# Patient Record
Sex: FEMALE | Race: WHITE
[De-identification: names, ages, dates, MRNs, and addresses within clinical notes are randomized per-mention and may not be internally consistent; named-entity substitution may affect disease eponyms.]

---

## 2017-11-29 NOTE — MAMMOGRAPHY REPORT
DIGITAL SCREENING MAMMOGRAM:  11/28/2017 

 

CLINICAL INDICATION:  A 67-year-old for screening. 

 

COMPARISON:  11/2016, 10/2015, 9/2014, 8/2013, 07/2012, 04/2011, 03/2010. 

 

TECHNIQUE:  Routine CC and MLO projections were obtained of the breasts. 

 

FINDINGS:  The breasts demonstrate scattered fibroglandular densities 
bilaterally.  Intramammary lymph nodes are stable.  Coarse and punctate, 
typically benign calcifications are present.  No suspicious masses, clustered 
microcalcifications, or regions of architectural distortion are identified. 

 

IMPRESSION:  BENIGN FINDINGS. 

 

RECOMMENDATION:  ROUTINE ANNUAL SCREENING UNLESS OTHERWISE CLINICALLY 
INDICATED. 

 

BIRADS CATEGORY:  2, BENIGN FINDINGS. 

 

STANDARD QUALIFYING STATEMENTS

1.  This examination was reviewed with the aid of Computed-Aided Detection (CAD)
.

2.  A negative or benign imaging report should not delay biopsy if clinically 
suspicious findings are present.  Consider surgical consultation if warranted.  
More than 5% of cancers are not identified by imaging.

3.  Dense breasts may obscure an underlying neoplasm. 

 

 

 

DD:11/29/2017 13:24:15  DT: 11/29/2017 17:15  JOB #: [id number]  EXT JOB #:
[id number]

[facility]

## 2019-01-17 NOTE — MAMMOGRAPHY REPORT
Reason:  SCREENING MAMMO

Procedure Date:  01/16/2019   

Accession Number:  [id number] / [id number]                    

Procedure:  KARENA - Screening Mammo w/Dave CPT Code:  

 

FULL RESULT:

 

 

EXAM: Screening Mammo w/Dave

 

DATE: 1/16/2019 11:49 AM

 

CLINICAL HISTORY: Routine screening

 

TECHNIQUE: Bilateral CC and MLO views were obtained.

 

COMPARISON: 11/28/2017, 11/3/2016, 10/19/2015, 9/2/14 and 8/7/2013

 

FINDINGS:

There are scattered fibroglandular densities. There has been no 

significant change. No suspicious masses, clustered microcalcifications, 

or regions of architectural distortion are identified. Bilateral nodular 

densities are stable.

 

IMPRESSION: Benign findings

 

RECOMMENDATION: Routine annual screening unless otherwise clinically 

indicated.

 

BIRADS CATEGORY 2: Benign findings

 

STANDARD QUALIFYING STATEMENTS:

1.  This examination was not reviewed with the aid of Computer-Aided 

Detection (CAD).

2.  A negative or benign  imaging report should not delay biopsy if 

clinically suspicious findings are present.  Consider surgical 

consultation if warrented.  More than 5% of cancers are not identified by 

imaging.

3.  Dense breasts may obscure an underlying neoplasm.

4. This examination was reviewed with the aid of 3D breast imaging 

(tomosynthesis).

## 2020-06-12 NOTE — MAMMOGRAPHY REPORT
BILATERAL DIGITAL SCREENING MAMMOGRAM 3D/2D: 6/11/2020

 

CLINICAL: Routine screening.  

 

Comparison is made to exams dated:  1/16/2019 mammogram, 11/28/2017 mammogram, and 11/3/2016 mammogra
m - [facility].  There are scattered fibroglandular elements in both breasts.  

 

No significant masses, calcifications, or other findings are seen in either breast.  

There has been no significant interval change.

 

IMPRESSION: NEGATIVE

There is no mammographic evidence of malignancy. A 1 year screening mammogram is recommended.  

 

 

This exam was interpreted at Station ID: [id number].  

 

NOTE: For mammograms, a report in lay terms will be sent to the patient. Approximately 15% of breast 
malignancies will not be visualized mammographically. In the management of a palpable breast mass, a 
negative mammogram must not discourage biopsy of a clinically suspicious lesion.

 

Electronically Signed By: Brian Roberson M.D.          

ddp/penrad:6/11/2020 16:42:40  

 

 

 

ACR BI-RADS Category 1: Negative 3341F

PARENCHYMAL PATTERN: (A) - The breast(s) demonstrate(s) scattered fibroglandular densities.

BI-RADS CATEGORY: (1) - 1

RECOMMENDATION: (ANNUAL)  - Recommend routine annual screening mammography.

[id number]

1 year screening

LATERALITY: (B)

## 2021-01-29 NOTE — CT REPORT
PROCEDURE:  CERVICAL SPINE WO

 

INDICATIONS:  fall off ladder

 

TECHNIQUE:  

Noncontrast 3 mm thick sections acquired from the skull base to the T4 level.  Sagittal and coronal r
eformats were then constructed.  For radiation dose reduction, the following was used:  automated exp
osure control, adjustment of mA and/or kV according to patient size.

 

COMPARISON:  None.

 

FINDINGS:  

Image quality:  Excellent.  

 

Bones:  No fractures or subluxation. There is multilevel degenerative disc disease including moderate
 degeneration at C4-C5, C5-C6, and C6-C7 with endplate sclerosis and osteophytosis. Mild uncovertebra
l joint arthropathy is also present in the mid and lower cervical spine. There is mild to moderate fa
cet arthropathy throughout the cervical spine. Visualized superior ribs are intact.  

 

Soft tissues:  Prevertebral soft tissues are normal in thickness.  No paravertebral hematomas.  No ap
ical pneumothoraces.  

 

IMPRESSION:  

 

1. No fracture or subluxation.

 

2. Multilevel degenerative changes throughout the cervical spine.

 

Reviewed by: Brian Roberson MD on 1/29/2021 3:37 PM PST

Approved by: Brian Roberson MD on 1/29/2021 3:37 PM PST

 

 

Station ID:  [id number]

## 2021-01-29 NOTE — CT REPORT
PROCEDURE:  HEAD WO

 

INDICATIONS:  fall off ladder

 

TECHNIQUE:  

Noncontrast 4.5 mm thick angled axial sections acquired from the foramen magnum to the vertex.  For r
adiation dose reduction, the following was used:  automated exposure control, adjustment of mA and/or
 kV according to patient size.

 

COMPARISON:  None.

 

FINDINGS:  

Image quality:  Excellent.  

 

CSF spaces:  Basal cisterns are patent.  No extra-axial fluid collections.  Ventricles are normal in 
size and shape.  

 

Brain:  No intracranial hemorrhage, mass, or mass effect. Gray-white matter interface is preserved.  


 

Skull and face:  Calvarium and visualized facial bones are intact, without suspicious lesions.  

 

Sinuses:  Visualized sinuses and mastoids are clear.  

 

IMPRESSION:  

 

1. No acute intracranial abnormality.

 

Reviewed by: Brian Roberson MD on 1/29/2021 3:34 PM PST

Approved by: Brian Roberson MD on 1/29/2021 3:34 PM [facility]

 

 

Station ID:  [id number]

## 2021-01-29 NOTE — XRAY REPORT
PROCEDURE:  Chest 1 View X-Ray

 

INDICATIONS:  PAIN S/P FALL

 

TECHNIQUE:  One view of the chest was acquired.  

 

COMPARISON:  None

 

FINDINGS:  

 

Surgical changes and devices:  None.  

 

Lungs and pleura:  No pleural effusions or pneumothorax.  Lungs are clear.  

 

Mediastinum:  Mediastinal contours appear normal.  Heart size is normal.  

 

Bones and chest wall:  No suspicious bony lesions.  Overlying soft tissues appear unremarkable.  

 

IMPRESSION:  

No acute cardiopulmonary pathology.

 

Reviewed by: Markie Newton MD on 1/29/2021 3:11 PM PST

Approved by: Markie Newton MD on 1/29/2021 3:11 PM [facility]

 

 

Station ID:  [id number]

## 2021-02-18 ENCOUNTER — HOSPITAL ENCOUNTER (OUTPATIENT)
Dept: HOSPITAL 76 - SDS | Age: 71
Discharge: HOME | End: 2021-02-18
Attending: SURGERY
Payer: MEDICARE

## 2021-02-18 VITALS — SYSTOLIC BLOOD PRESSURE: 138 MMHG | DIASTOLIC BLOOD PRESSURE: 70 MMHG

## 2021-02-18 DIAGNOSIS — K29.70: ICD-10-CM

## 2021-02-18 DIAGNOSIS — E78.5: ICD-10-CM

## 2021-02-18 DIAGNOSIS — I10: ICD-10-CM

## 2021-02-18 DIAGNOSIS — K57.30: ICD-10-CM

## 2021-02-18 DIAGNOSIS — K64.8: ICD-10-CM

## 2021-02-18 DIAGNOSIS — Z12.11: Primary | ICD-10-CM

## 2021-02-22 ENCOUNTER — HOSPITAL ENCOUNTER (OUTPATIENT)
Dept: HOSPITAL 76 - DI.N | Age: 71
Discharge: HOME | End: 2021-02-22
Attending: FAMILY MEDICINE
Payer: MEDICARE

## 2021-02-22 DIAGNOSIS — M54.5: Primary | ICD-10-CM

## 2021-02-22 DIAGNOSIS — Z91.81: ICD-10-CM

## 2021-02-22 DIAGNOSIS — M47.816: ICD-10-CM

## 2021-02-22 NOTE — XRAY REPORT
PROCEDURE:  Lumbar Spine 2 View

 

INDICATIONS:  LOW BACK PX, ACUTE S/P FALL

 

TECHNIQUE:  2 views of the lumbar spine were acquired.  

 

COMPARISON:  None.

 

FINDINGS:  

 

No fracture. Scattered multilevel endplate spurring and diffuse facet arthropathy.

Grade 1 anterolisthesis of L4 on L5. Trace retrolisthesis of L3 on L4. Diffuse mild narrowing of the 
lumbar disc spaces

 

 Soft tissues:  Overlying bowel gas pattern is normal.  No suspicious soft tissue calcifications.  

 

IMPRESSION:  

 

Lumbar spondylosis and grade 1 anterolisthesis of L4 on L5.

 

Facet arthropathy

 

 

Reviewed by: Gerardo Cagle MD on 2/22/2021 3:30 PM PST

Approved by: Gerardo Cagle MD on 2/22/2021 3:30 PM PST

 

 

Station ID:  [id number]

## 2021-06-16 ENCOUNTER — HOSPITAL ENCOUNTER (OUTPATIENT)
Dept: HOSPITAL 76 - LAB.WCP | Age: 71
Discharge: HOME | End: 2021-06-16
Attending: FAMILY MEDICINE
Payer: MEDICARE

## 2021-06-16 DIAGNOSIS — R73.01: Primary | ICD-10-CM

## 2021-06-16 DIAGNOSIS — I10: ICD-10-CM

## 2021-06-16 DIAGNOSIS — E78.5: ICD-10-CM

## 2021-06-16 LAB
ALBUMIN DIAFP-MCNC: 4.3 G/DL (ref 3.2–5.5)
ALBUMIN/GLOB SERPL: 1.5 {RATIO} (ref 1–2.2)
ALP SERPL-CCNC: 56 IU/L (ref 42–121)
ALT SERPL W P-5'-P-CCNC: 34 IU/L (ref 10–60)
ANION GAP SERPL CALCULATED.4IONS-SCNC: 10 MMOL/L (ref 6–13)
AST SERPL W P-5'-P-CCNC: 27 IU/L (ref 10–42)
BASOPHILS NFR BLD AUTO: 0.1 10^3/UL (ref 0–0.1)
BASOPHILS NFR BLD AUTO: 1.2 %
BILIRUB BLD-MCNC: 1.3 MG/DL (ref 0.2–1)
BUN SERPL-MCNC: 33 MG/DL (ref 6–20)
CALCIUM UR-MCNC: 10.6 MG/DL (ref 8.5–10.3)
CHLORIDE SERPL-SCNC: 97 MMOL/L (ref 101–111)
CHOLEST SERPL-MCNC: 232 MG/DL
CO2 SERPL-SCNC: 32 MMOL/L (ref 21–32)
CREAT SERPLBLD-SCNC: 0.9 MG/DL (ref 0.4–1)
EOSINOPHIL # BLD AUTO: 0.3 10^3/UL (ref 0–0.7)
EOSINOPHIL NFR BLD AUTO: 6 %
ERYTHROCYTE [DISTWIDTH] IN BLOOD BY AUTOMATED COUNT: 12.9 % (ref 12–15)
EST. AVERAGE GLUCOSE BLD GHB EST-MCNC: 100 MG/DL (ref 70–100)
GFRSERPLBLD MDRD-ARVRAT: 62 ML/MIN/{1.73_M2} (ref 89–?)
GLOBULIN SER-MCNC: 2.9 G/DL (ref 2.1–4.2)
GLUCOSE SERPL-MCNC: 111 MG/DL (ref 70–100)
HBA1C MFR BLD HPLC: 5.1 % (ref 4.27–6.07)
HCT VFR BLD AUTO: 45.3 % (ref 37–47)
HDLC SERPL-MCNC: 120 MG/DL
HDLC SERPL: 1.9 {RATIO} (ref ?–4.4)
HGB UR QL STRIP: 14.5 G/DL (ref 12–16)
LDLC SERPL CALC-MCNC: 104 MG/DL
LDLC/HDLC SERPL: 0.9 {RATIO} (ref ?–4.4)
LYMPHOCYTES # SPEC AUTO: 1.1 10^3/UL (ref 1.5–3.5)
LYMPHOCYTES NFR BLD AUTO: 25.3 %
MCH RBC QN AUTO: 29.3 PG (ref 27–31)
MCHC RBC AUTO-ENTMCNC: 32 G/DL (ref 32–36)
MCV RBC AUTO: 91.5 FL (ref 81–99)
MONOCYTES # BLD AUTO: 0.4 10^3/UL (ref 0–1)
MONOCYTES NFR BLD AUTO: 9.2 %
NEUTROPHILS # BLD AUTO: 2.4 10^3/UL (ref 1.5–6.6)
NEUTROPHILS # SNV AUTO: 4.2 X10^3/UL (ref 4.8–10.8)
NEUTROPHILS NFR BLD AUTO: 58.1 %
NRBC # BLD AUTO: 0 /100WBC
NRBC # BLD AUTO: 0 X10^3/UL
PDW BLD AUTO: 11 FL (ref 7.9–10.8)
PLATELET # BLD: 250 10^3/UL (ref 130–450)
POTASSIUM SERPL-SCNC: 3.8 MMOL/L (ref 3.5–5)
PROT SPEC-MCNC: 7.2 G/DL (ref 6.7–8.2)
RBC MAR: 4.95 10^6/UL (ref 4.2–5.4)
SODIUM SERPLBLD-SCNC: 139 MMOL/L (ref 135–145)
TRIGL P FAST SERPL-MCNC: 42 MG/DL
VLDLC SERPL-SCNC: 8 MG/DL

## 2021-06-16 PROCEDURE — 80053 COMPREHEN METABOLIC PANEL: CPT

## 2021-06-16 PROCEDURE — 80061 LIPID PANEL: CPT

## 2021-06-16 PROCEDURE — 85025 COMPLETE CBC W/AUTO DIFF WBC: CPT

## 2021-06-16 PROCEDURE — 83036 HEMOGLOBIN GLYCOSYLATED A1C: CPT

## 2021-06-16 PROCEDURE — 83721 ASSAY OF BLOOD LIPOPROTEIN: CPT

## 2021-06-16 PROCEDURE — 36415 COLL VENOUS BLD VENIPUNCTURE: CPT

## 2021-07-26 ENCOUNTER — HOSPITAL ENCOUNTER (OUTPATIENT)
Dept: HOSPITAL 76 - DI | Age: 71
Discharge: HOME | End: 2021-07-26
Attending: GENERAL ACUTE CARE HOSPITAL
Payer: MEDICARE

## 2021-07-26 DIAGNOSIS — Z12.31: Primary | ICD-10-CM

## 2021-07-27 NOTE — MAMMOGRAPHY REPORT
BILATERAL DIGITAL SCREENING MAMMOGRAM 3D/2D: 7/26/2021

 

CLINICAL: Routine screening.  

 

Comparison is made to exams dated:  6/11/2020 mammogram, 1/16/2019 mammogram, 11/28/2017 mammogram, 1
1/3/2016 mammogram, 10/19/2015 mammogram, and 9/2/2014 mammogram - [facility].  The
re are scattered fibroglandular elements in both breasts.  

 

No significant masses, calcifications, or other findings are seen in either breast.  

There has been no significant interval change.

 

IMPRESSION: NEGATIVE

There is no mammographic evidence of malignancy. A 1 year screening mammogram is recommended.  

 

 

This exam was interpreted at Station ID: [id number].  

 

NOTE: For mammograms, a report in lay terms will be sent to the patient. Approximately 15% of breast 
malignancies will not be visualized mammographically. In the management of a palpable breast mass, a 
negative mammogram must not discourage biopsy of a clinically suspicious lesion.

 

Electronically Signed By: Braden Navas M.D., jr/deejay:7/26/2021 09:34:46  

 

 

 

ACR BI-RADS Category 1: Negative 3341F

PARENCHYMAL PATTERN: (A) - The breast(s) demonstrate(s) scattered fibroglandular densities.

BI-RADS CATEGORY: (1) - 1

RECOMMENDATION: (ANNUAL)  - Recommend routine annual screening mammography.

[id number]

1 year screening

LATERALITY: (B)

## 2022-09-19 ENCOUNTER — HOSPITAL ENCOUNTER (OUTPATIENT)
Dept: HOSPITAL 76 - DI | Age: 72
Discharge: HOME | End: 2022-09-19
Attending: GENERAL ACUTE CARE HOSPITAL
Payer: MEDICARE

## 2022-09-19 DIAGNOSIS — Z12.31: Primary | ICD-10-CM

## 2022-09-20 ENCOUNTER — HOSPITAL ENCOUNTER (OUTPATIENT)
Dept: HOSPITAL 76 - LAB.N | Age: 72
Discharge: HOME | End: 2022-09-20
Attending: NURSE PRACTITIONER
Payer: MEDICARE

## 2022-09-20 DIAGNOSIS — I10: Primary | ICD-10-CM

## 2022-09-20 DIAGNOSIS — R73.01: ICD-10-CM

## 2022-09-20 DIAGNOSIS — E55.9: ICD-10-CM

## 2022-09-20 LAB
ALBUMIN DIAFP-MCNC: 4.2 G/DL (ref 3.2–5.5)
ALBUMIN/GLOB SERPL: 1.5 {RATIO} (ref 1–2.2)
ALP SERPL-CCNC: 53 IU/L (ref 42–121)
ALT SERPL W P-5'-P-CCNC: 26 IU/L (ref 10–60)
ANION GAP SERPL CALCULATED.4IONS-SCNC: 8 MMOL/L (ref 6–13)
AST SERPL W P-5'-P-CCNC: 23 IU/L (ref 10–42)
BASOPHILS NFR BLD AUTO: 0.1 10^3/UL (ref 0–0.1)
BASOPHILS NFR BLD AUTO: 1.3 %
BILIRUB BLD-MCNC: 1 MG/DL (ref 0.2–1)
BUN SERPL-MCNC: 21 MG/DL (ref 6–20)
CALCIUM UR-MCNC: 10.8 MG/DL (ref 8.5–10.3)
CHLORIDE SERPL-SCNC: 101 MMOL/L (ref 101–111)
CHOLEST SERPL-MCNC: 221 MG/DL
CO2 SERPL-SCNC: 31 MMOL/L (ref 21–32)
CREAT SERPLBLD-SCNC: 0.8 MG/DL (ref 0.4–1)
EOSINOPHIL # BLD AUTO: 0.2 10^3/UL (ref 0–0.7)
EOSINOPHIL NFR BLD AUTO: 4 %
ERYTHROCYTE [DISTWIDTH] IN BLOOD BY AUTOMATED COUNT: 13.1 % (ref 12–15)
EST. AVERAGE GLUCOSE BLD GHB EST-MCNC: 94 MG/DL (ref 70–100)
GFRSERPLBLD MDRD-ARVRAT: 70 ML/MIN/{1.73_M2} (ref 89–?)
GLOBULIN SER-MCNC: 2.8 G/DL (ref 2.1–4.2)
GLUCOSE SERPL-MCNC: 97 MG/DL (ref 70–100)
HBA1C MFR BLD HPLC: 4.9 % (ref 4.27–6.07)
HCT VFR BLD AUTO: 41.9 % (ref 37–47)
HDLC SERPL-MCNC: 120 MG/DL
HDLC SERPL: 1.8 {RATIO} (ref ?–4.4)
HGB UR QL STRIP: 14.2 G/DL (ref 12–16)
LDLC SERPL CALC-MCNC: 91 MG/DL
LDLC/HDLC SERPL: 0.8 {RATIO} (ref ?–4.4)
LYMPHOCYTES # SPEC AUTO: 1.2 10^3/UL (ref 1.5–3.5)
LYMPHOCYTES NFR BLD AUTO: 26.8 %
MCH RBC QN AUTO: 30.3 PG (ref 27–31)
MCHC RBC AUTO-ENTMCNC: 33.9 G/DL (ref 32–36)
MCV RBC AUTO: 89.5 FL (ref 81–99)
MONOCYTES # BLD AUTO: 0.4 10^3/UL (ref 0–1)
MONOCYTES NFR BLD AUTO: 8 %
NEUTROPHILS # BLD AUTO: 2.7 10^3/UL (ref 1.5–6.6)
NEUTROPHILS # SNV AUTO: 4.5 X10^3/UL (ref 4.8–10.8)
NEUTROPHILS NFR BLD AUTO: 59.7 %
NRBC # BLD AUTO: 0 /100WBC
NRBC # BLD AUTO: 0 X10^3/UL
PDW BLD AUTO: 11 FL (ref 7.9–10.8)
PLATELET # BLD: 231 10^3/UL (ref 130–450)
POTASSIUM SERPL-SCNC: 3.9 MMOL/L (ref 3.5–5)
PROT SPEC-MCNC: 7 G/DL (ref 6.7–8.2)
RBC MAR: 4.68 10^6/UL (ref 4.2–5.4)
SODIUM SERPLBLD-SCNC: 140 MMOL/L (ref 135–145)
TRIGL P FAST SERPL-MCNC: 52 MG/DL
TSH SERPL-ACNC: 2.14 UIU/ML (ref 0.34–5.6)
VLDLC SERPL-SCNC: 10 MG/DL

## 2022-09-20 PROCEDURE — 85025 COMPLETE CBC W/AUTO DIFF WBC: CPT

## 2022-09-20 PROCEDURE — 80061 LIPID PANEL: CPT

## 2022-09-20 PROCEDURE — 83721 ASSAY OF BLOOD LIPOPROTEIN: CPT

## 2022-09-20 PROCEDURE — 83036 HEMOGLOBIN GLYCOSYLATED A1C: CPT

## 2022-09-20 PROCEDURE — 84443 ASSAY THYROID STIM HORMONE: CPT

## 2022-09-20 PROCEDURE — 36415 COLL VENOUS BLD VENIPUNCTURE: CPT

## 2022-09-20 PROCEDURE — 82306 VITAMIN D 25 HYDROXY: CPT

## 2022-09-20 PROCEDURE — 80053 COMPREHEN METABOLIC PANEL: CPT

## 2022-09-20 NOTE — MAMMOGRAPHY REPORT
BILATERAL DIGITAL SCREENING MAMMOGRAM 3D/2D: 9/19/2022

 

CLINICAL: Routine screening.  

 

Comparison is made to exams dated:  7/26/2021 mammogram, 6/11/2020 mammogram, 1/16/2019 mammogram, 11
/28/2017 mammogram, and 11/3/2016 mammogram - [facility].  

 

There are scattered areas of fibroglandular density in both breasts (category b / 25%-50% glandular t
issue).  

 

No significant masses, calcifications, or other findings are seen in either breast.  

There has been no significant interval change.

 

IMPRESSION: NEGATIVE

There is no mammographic evidence of malignancy. A 1 year screening mammogram is recommended.  

 

Based on the Tyrer Cuzick model (a risk assessment model) the patients lifetime risk is 5.9% and her
 10 year risk is 4.8%. According to the ACR, ACS, and NCCN guidelines, an annual breast MRI exam nitin
g with mammogram is recommended if the patients lifetime risk is 20% or greater.

 

 

This exam was interpreted at Station ID: [id number].  

 

NOTE: For mammograms, a report in lay terms will be sent to the patient. Approximately 15% of breast 
malignancies will not be visualized mammographically. In the management of a palpable breast mass, a 
negative mammogram must not discourage biopsy of a clinically suspicious lesion.

 

Electronically Signed By: Mavis clements/deejay:9/20/2022 09:37:42  

 

 

 

ACR BI-RADS Category 1: Negative 3341F

PARENCHYMAL PATTERN: (A) - The breast(s) demonstrate(s) scattered fibroglandular densities.

BI-RADS CATEGORY: (1) - 1

RECOMMENDATION: (ANNUAL)  - Recommend routine annual screening mammography.

[id number]

1 year screening

LATERALITY: (B)

## 2022-09-26 ENCOUNTER — HOSPITAL ENCOUNTER (OUTPATIENT)
Dept: HOSPITAL 76 - DI | Age: 72
Discharge: HOME | End: 2022-09-26
Attending: NURSE PRACTITIONER
Payer: MEDICARE

## 2022-09-26 DIAGNOSIS — N95.9: ICD-10-CM

## 2022-09-26 DIAGNOSIS — M85.89: Primary | ICD-10-CM

## 2022-09-26 NOTE — DEXA REPORT
PROCEDURE: Dexa Spine and/or Hip

 

INDICATIONS: MENOPAUSAL

 

TECHNIQUE: Dual energy x-ray absorptiometry (DXA) was performed on a SERPs System.  Regions measur
ed are the AP Spine, femoral neck, and if needed forearm.

 

COMPARISON: None.

  

FINDINGS:

 

Lumbar Spine: 

Bone Mineral Density   1.245 g/cm/cm,T score  0.5, normal

 

Left total Hip:

Bone Mineral Density  0.854 g/cm/cm,T score -1.2, osteopenia

 

Left Femoral Neck:

Bone Mineral Density  0.852 g/cm/cm, T score -1.3, osteopenia

 

(T score greater or equal to -1.0: NORMAL)

(T score from -1.1 to -2.4: OSTEOPENIA)

(T score less than or equal to -2.5 to: OSTEOPOROSIS)

 

Impression: Osteopenia.

 

 

Patients with diagnosis of osteoporosis or osteopenia should have regular bone mineral density assess
ment.  For those eligible for Medicare, routine testing is allowed once every 2 years.  Testing frequ
ency can be increased for patients who have rapidly progressing disease or for those who are receivin
g medical therapy to restore bone mass.

 

Reviewed by: Soren Constantino on 9/26/2022 3:58 PM PDT

Approved by: Soren Constantino on 9/26/2022 3:58 PM PDT

 

 

Station ID:  [id number]

## 2023-04-20 ENCOUNTER — APPOINTMENT (RX ONLY)
Dept: URBAN - METROPOLITAN AREA CLINIC 50 | Facility: CLINIC | Age: 73
Setting detail: DERMATOLOGY
End: 2023-04-20

## 2023-04-20 DIAGNOSIS — B07.8 OTHER VIRAL WARTS: ICD-10-CM

## 2023-04-20 DIAGNOSIS — L82.1 OTHER SEBORRHEIC KERATOSIS: ICD-10-CM

## 2023-04-20 DIAGNOSIS — L57.0 ACTINIC KERATOSIS: ICD-10-CM

## 2023-04-20 DIAGNOSIS — D18.0 HEMANGIOMA: ICD-10-CM

## 2023-04-20 DIAGNOSIS — Z85.828 PERSONAL HISTORY OF OTHER MALIGNANT NEOPLASM OF SKIN: ICD-10-CM

## 2023-04-20 DIAGNOSIS — L81.4 OTHER MELANIN HYPERPIGMENTATION: ICD-10-CM

## 2023-04-20 PROBLEM — D18.01 HEMANGIOMA OF SKIN AND SUBCUTANEOUS TISSUE: Status: ACTIVE | Noted: 2023-04-20

## 2023-04-20 PROCEDURE — 17000 DESTRUCT PREMALG LESION: CPT | Mod: 59

## 2023-04-20 PROCEDURE — 17110 DESTRUCTION B9 LES UP TO 14: CPT

## 2023-04-20 PROCEDURE — ? LIQUID NITROGEN

## 2023-04-20 PROCEDURE — ? COUNSELING

## 2023-04-20 PROCEDURE — ? OBSERVATION

## 2023-04-20 PROCEDURE — 99203 OFFICE O/P NEW LOW 30 MIN: CPT | Mod: 25

## 2023-04-20 ASSESSMENT — LOCATION SIMPLE DESCRIPTION DERM
LOCATION SIMPLE: CHEST
LOCATION SIMPLE: RIGHT CHEEK
LOCATION SIMPLE: UPPER BACK
LOCATION SIMPLE: LEFT PRETIBIAL REGION

## 2023-04-20 ASSESSMENT — LOCATION ZONE DERM
LOCATION ZONE: FACE
LOCATION ZONE: TRUNK
LOCATION ZONE: LEG

## 2023-04-20 ASSESSMENT — LOCATION DETAILED DESCRIPTION DERM
LOCATION DETAILED: LEFT MEDIAL SUPERIOR CHEST
LOCATION DETAILED: RIGHT MEDIAL MALAR CHEEK
LOCATION DETAILED: RIGHT MEDIAL SUPERIOR CHEST
LOCATION DETAILED: LEFT DISTAL PRETIBIAL REGION
LOCATION DETAILED: SUPERIOR THORACIC SPINE

## 2023-04-20 NOTE — HPI: FULL BODY SKIN EXAMINATION
What Is The Reason For Today's Visit?: Full Body Skin Examination with No Concerns
Additional History: Pt states that she has no concerns presents for full body.

## 2023-04-20 NOTE — PROCEDURE: LIQUID NITROGEN
Post-Care Instructions: I reviewed with the patient in detail post-care instructions. Patient is to wear sunprotection, and avoid picking at any of the treated lesions. Pt may apply Vaseline to crusted or scabbing areas.
Duration Of Freeze Thaw-Cycle (Seconds): 2
Show Applicator Variable?: Yes
Number Of Freeze-Thaw Cycles: 1 freeze-thaw cycle
Application Tool (Optional): Liquid Nitrogen Sprayer
Render Note In Bullet Format When Appropriate: No
Consent: The patient's consent was obtained including but not limited to risks of crusting, scabbing, blistering, scarring, darker or lighter pigmentary change, recurrence, incomplete removal and infection.
Detail Level: Detailed
Spray Paint Text: The liquid nitrogen was applied to the skin utilizing a spray paint frosting technique.
Duration Of Freeze Thaw-Cycle (Seconds): 5-10
Medical Necessity Information: It is in your best interest to select a reason for this procedure from the list below. All of these items fulfill various CMS LCD requirements except the new and changing color options.
Medical Necessity Clause: This procedure was medically necessary because the lesions that were treated were:

## 2024-11-18 NOTE — ED PHYSICIAN DOCUMENTATION
History of Present Illness





- Stated complaint


Stated Complaint: FELL OFF LADDER,LOC





- Chief complaint


Chief Complaint: Trauma Hd/Nk





- Additonal information


Additional information: 


70-year-old female presents to the emergency department for evaluation of loss 

of consciousness after a fall off a ladder when a branch she was cutting from a 

tree knocked the ladder down.  She reports that she was on the fourth rung 

falling backwards flat onto her back.  The fall was witnessed by 2 retired RNs 

at the scene.  They do report loss of consciousness for perhaps as long as 10 

minutes.  However patient reports that at this time she feels well however she 

does not remember the fall.  She denies headache neck pain though she feels that

her upper back is stiff.  No history of anticoagulation.





Patient was ambulatory on scene and driven to the emergency department.  She was

placed as a modified trauma on presentation and a rigid cervical collar.








Review of Systems


Constitutional: reports: Reviewed and negative


Ears: reports: Reviewed and negative


Nose: reports: Reviewed and negative


Throat: reports: Reviewed and negative


Cardiac: reports: Reviewed and negative


Respiratory: reports: Reviewed and negative


GI: reports: Reviewed and negative


: reports: Reviewed and negative


Skin: reports: Reviewed and negative


Musculoskeletal: reports: Back pain.  denies: Neck pain


Neurologic: reports: Reviewed and negative


Psychiatric: reports: Reviewed and negative


Endocrine: reports: Reviewed and negative





PD PAST MEDICAL HISTORY





- Past Medical History


Cardiovascular: Hypertension, High cholesterol, Murmur


Respiratory: None


Endocrine/Autoimmune: None


GI: None, Colon polyps


: None


HEENT: Glaucoma, Chronic sinusitis


Psych: None


Musculoskeletal: Osteoarthritis


Derm: Eczema





- Past Surgical History


General: Colonoscopy


/GYN:  section


Derm: Skin cancer surgery





- Present Medications


Home Medications: 


                                Ambulatory Orders











 Medication  Instructions  Recorded  Confirmed


 


Ascorbic Acid [Vitamin C] 1 mg PO DAILY 20


 


Aspirin [Aspirin EC] 1 mg PO DAILY 20


 


Losartan [Cozaar] 25 mg PO DAILY 20


 


Multivit with Minerals/Lutein 1 each PO DAILY 20





[Theratrum Complete 50 Plus Tab]   


 


Omega-3/Dha/Epa/Fish Oil [Fish Oil 1 each PO DAILY 20





1,000 mg Softgel]   


 


Pravastatin Sodium [Pravachol] 20 mg PO DAILY 20


 


Triamterene/Hydrochlorothiazid 37.5 mg PO DAILY 20





[Triamterene-Hctz 37.5-25 mg Tb]   


 


Acetaminophen [Tylenol] 650 mg PO Q6H PRN #30 tab 21 














- Allergies


Allergies/Adverse Reactions: 


                                    Allergies











Allergy/AdvReac Type Severity Reaction Status Date / Time


 


amoxicillin Allergy  Hives Verified 21 14:50


 


ciprofloxacin Allergy  Nausea Verified 21 14:50


 


cortisone Allergy  Rash Verified 21 14:50


 


erythromycin base Allergy  Cramps Verified 21 14:50


 


naproxen Allergy  Nausea Verified 21 14:50


 


Sulfa (Sulfonamide Allergy  Nausea Verified 21 14:50





Antibiotics)     














PD ED PE EXPANDED





- General


General: Alert, No acute distress, Well developed/nourished





- Neck


Neck: Supple w/out meningeal sx, No tenderness.  No: Limited ROM





- Cardiac


Cardiac: Regular Rate, Regular Rhythm, Radial strong equal, Pedal strong equal





- Respiratory


Respiratory: Clear to ausultation ita.  No: Distress, Labored





- Abdomen


Abdomen: Normal Bowel sounds.  No: Tender to palpation





- Derm


Derm: Normal color, Warm and dry.  No: Rash





- Extremities


Extremities: Normal, Other (motor strength 5/5 X4).  No: Deformity, Tenderness





- Neuro


Neuro: Alert and Oriented X 3, CNII-XII intact, Normal gait, Normal finger nose,

 Normal speech





- GCS


Eye Opening: Spontaneous


Motor: Obeys Commands


Verbal: Oriented


Total: 15





Results





- Vitals


Vitals: 


                               Vital Signs - 24 hr











  21





  14:51 15:24 16:17


 


Temperature 36.5 C  


 


Heart Rate 77 80 85


 


Respiratory 16 24 16





Rate   


 


Blood Pressure 150/68 H 149/76 H 130/77


 


O2 Saturation 98 99 98








                                     Oxygen











O2 Source                      Room air

















- Labs


Labs: 


                                Laboratory Tests











  21





  15:03 15:03 15:03


 


WBC  9.7  


 


RBC  4.67  


 


Hgb  13.8  


 


Hct  41.6  


 


MCV  89.1  


 


MCH  29.6  


 


MCHC  33.2  


 


RDW  13.1  


 


Plt Count  274  


 


MPV  10.6  


 


Neut # (Auto)  7.8 H  


 


Lymph # (Auto)  1.1 L  


 


Mono # (Auto)  0.6  


 


Eos # (Auto)  0.1  


 


Baso # (Auto)  0.1  


 


Absolute Nucleated RBC  0.00  


 


Nucleated RBC %  0.0  


 


PT    12.6


 


INR    1.1


 


Sodium   140 


 


Potassium   3.2 L 


 


Chloride   100 L 


 


Carbon Dioxide   27 


 


Anion Gap   13.0 


 


BUN   27 H 


 


Creatinine   1.0 


 


Estimated GFR (MDRD)   55 L 


 


Glucose   114 H 


 


Calcium   10.7 H 


 


Total Bilirubin   0.9 


 


AST   31 


 


ALT   35 


 


Alkaline Phosphatase   59 


 


Total Protein   7.5 


 


Albumin   4.3 


 


Globulin   3.2 


 


Albumin/Globulin Ratio   1.3 


 


Lipase   23 














- Rads (name of study)


  ** CT neck


Radiology: Final report received (No acute fracture.  Multilevel degenerative 

changes seen throughout the cervical spine.)





  ** CT head


Radiology: Final report received (No acute intracranial abnormality.)





  ** CXR


Radiology: Final report received (No acute cardiopulmonary pathology.)





PD MEDICAL DECISION MAKING





- ED course


Complexity details: reviewed results, re-evaluated patient, considered 

differential, d/w patient


ED course: 


This is a well-appearing 70-year-old female that presents to the emergency 

department for evaluation after a fall from a ladder this afternoon.  A tree 

branch knocked the ladder from under her and she fell flat onto her back.  She 

did have associated loss of consciousness.  She presented as a modified trauma 

and had been placed in C-spine precautions.





On initial presentation patient is alert and very well-appearing.  She had no 

motor deficits and was quite cognizant.  Given history of trauma a CT of the 

head and neck was completed.  No acute fractures noted.  We do note degenerative

 changes in the cervical spine.  Cervical collar was removed and patient is 

moving normally with a normal gait.





Patient will be discharged home.  Recommend Tylenol or ibuprofen at home for 

discomfort.  Emergent return precautions discussed for suddenly severe worsening

 pain, fevers uncontrolled vomiting.








Departure





- Departure


Disposition: 01 Home, Self Care


Clinical Impression: 


 Loss of consciousness





Fall from ladder


Qualifiers:


 Encounter type: initial encounter Qualified Code(s): W11.XXXA - Fall on and 

from ladder, initial encounter





Concussion


Qualifiers:


 Encounter type: initial encounter Loss of consciousness presence/duration: with

 LOC of 30 min or less Qualified Code(s): S06.0X1A - Concussion with loss of 

consciousness of 30 minutes or less, initial encounter





Condition: Stable


Record reviewed to determine appropriate education?: Yes


Instructions:  Concussion Dc


Follow-Up: 


Don Rutherford DO [Primary Care Provider] - 


Prescriptions: 


Acetaminophen [Tylenol] 650 mg PO Q6H PRN #30 tab


 PRN Reason: Pain


Comments: 


You were seen today after a fall from a ladder.  The chest x-ray and CT of your 

head do not show any broken bones.  Your cervical spine does not show any broken

 bones however you do have degenerative disc disease. 





However because you did lose consciousness you likely have suffered a 

concussion.  The best treatment for this is to get plenty of sleep and avoid 

excessive screen time, TV or computer usage.  I do recommend that you take 

Tylenol with food 3-4 times a day for the next few days.  It is likely you will 

feel sore over the next 48 to 72 hours but then should begin to feel better.





If at any point you develop a suddenly severe headache, have uncontrolled 

vomiting, fevers weakness in your arms or legs please return immediately to the 

ER.
None

## 2024-11-19 ENCOUNTER — APPOINTMENT (RX ONLY)
Dept: URBAN - METROPOLITAN AREA CLINIC 50 | Facility: CLINIC | Age: 74
Setting detail: DERMATOLOGY
End: 2024-11-19

## 2024-11-19 DIAGNOSIS — L81.4 OTHER MELANIN HYPERPIGMENTATION: ICD-10-CM

## 2024-11-19 DIAGNOSIS — D18.0 HEMANGIOMA: ICD-10-CM

## 2024-11-19 DIAGNOSIS — L82.1 OTHER SEBORRHEIC KERATOSIS: ICD-10-CM

## 2024-11-19 DIAGNOSIS — L57.0 ACTINIC KERATOSIS: ICD-10-CM

## 2024-11-19 DIAGNOSIS — D22 MELANOCYTIC NEVI: ICD-10-CM

## 2024-11-19 PROBLEM — D18.01 HEMANGIOMA OF SKIN AND SUBCUTANEOUS TISSUE: Status: ACTIVE | Noted: 2024-11-19

## 2024-11-19 PROBLEM — D22.71 MELANOCYTIC NEVI OF RIGHT LOWER LIMB, INCLUDING HIP: Status: ACTIVE | Noted: 2024-11-19

## 2024-11-19 PROCEDURE — ? LIQUID NITROGEN

## 2024-11-19 PROCEDURE — ? SUNSCREEN RECOMMENDATIONS

## 2024-11-19 PROCEDURE — 17003 DESTRUCT PREMALG LES 2-14: CPT

## 2024-11-19 PROCEDURE — 99213 OFFICE O/P EST LOW 20 MIN: CPT | Mod: 25

## 2024-11-19 PROCEDURE — ? COUNSELING

## 2024-11-19 PROCEDURE — 17000 DESTRUCT PREMALG LESION: CPT

## 2024-11-19 ASSESSMENT — LOCATION SIMPLE DESCRIPTION DERM
LOCATION SIMPLE: LEFT CALF
LOCATION SIMPLE: LEFT CHEEK
LOCATION SIMPLE: CHEST
LOCATION SIMPLE: RIGHT POSTERIOR UPPER ARM
LOCATION SIMPLE: LEFT SHOULDER
LOCATION SIMPLE: RIGHT THIGH

## 2024-11-19 ASSESSMENT — LOCATION ZONE DERM
LOCATION ZONE: TRUNK
LOCATION ZONE: LEG
LOCATION ZONE: FACE
LOCATION ZONE: ARM

## 2024-11-19 ASSESSMENT — LOCATION DETAILED DESCRIPTION DERM
LOCATION DETAILED: LEFT DISTAL CALF
LOCATION DETAILED: RIGHT MEDIAL SUPERIOR CHEST
LOCATION DETAILED: LEFT POSTERIOR SHOULDER
LOCATION DETAILED: LEFT CENTRAL MALAR CHEEK
LOCATION DETAILED: RIGHT PROXIMAL LATERAL POSTERIOR UPPER ARM
LOCATION DETAILED: LEFT LATERAL SUPERIOR CHEST
LOCATION DETAILED: RIGHT ANTERIOR DISTAL THIGH

## 2024-11-19 NOTE — HPI: EVALUATION OF SKIN LESION(S)
What Type Of Note Output Would You Prefer (Optional)?: Standard Output
Hpi Title: Evaluation of Skin Lesions
How Severe Are Your Spot(S)?: moderate
Have Your Spot(S) Been Treated In The Past?: has not been treated
Additional History: Patient is being seen today for routine skin exam. She’s does not have any spots of concern and no history of skin cancer.

## 2024-11-19 NOTE — PROCEDURE: LIQUID NITROGEN
Consent: The patient's consent was obtained including but not limited to risks of crusting, scabbing, blistering, scarring, darker or lighter pigmentary change, recurrence, incomplete removal and infection.
Detail Level: Detailed
Post-Care Instructions: I reviewed with the patient in detail post-care instructions. Patient is to wear sunprotection, and avoid picking at any of the treated lesions. Pt may apply Vaseline to crusted or scabbing areas.
Application Tool (Optional): Liquid Nitrogen Sprayer
Show Aperture Variable?: Yes
Render Post-Care Instructions In Note?: no
Duration Of Freeze Thaw-Cycle (Seconds): 2
Number Of Freeze-Thaw Cycles: 1 freeze-thaw cycle